# Patient Record
Sex: FEMALE | Race: OTHER
[De-identification: names, ages, dates, MRNs, and addresses within clinical notes are randomized per-mention and may not be internally consistent; named-entity substitution may affect disease eponyms.]

---

## 2021-07-12 ENCOUNTER — HOSPITAL ENCOUNTER (OUTPATIENT)
Dept: HOSPITAL 63 - RAD | Age: 51
End: 2021-07-12
Attending: PHYSICIAN ASSISTANT
Payer: COMMERCIAL
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
## 2021-07-12 DIAGNOSIS — S89.92XA: Primary | ICD-10-CM
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
## 2021-07-12 PROCEDURE — 73590 X-RAY EXAM OF LOWER LEG: CPT

## 2021-07-12 NOTE — RAD
EXAM: Left tibia and fibula, 2 views.



HISTORY: Shin injury.



COMPARISON: None.



FINDINGS: 2 views of the tibiofibular obtained. There is no fracture, dislocation or subluxation. The
re is no periosteal reaction or lytic or sclerotic osseous lesion. There is no radiodense foreign bod
y.



IMPRESSION: No acute osseous finding.



Electronically signed by: Alysa Merlos MD (7/12/2021 11:41 AM) [id number]

## 2021-10-08 ENCOUNTER — HOSPITAL ENCOUNTER (OUTPATIENT)
Dept: HOSPITAL 63 - LAB | Age: 51
End: 2021-10-08
Attending: INTERNAL MEDICINE
Payer: COMMERCIAL

## 2021-10-08 DIAGNOSIS — Z01.812: Primary | ICD-10-CM

## 2021-10-08 DIAGNOSIS — Z12.11: ICD-10-CM

## 2021-10-08 DIAGNOSIS — Z20.822: ICD-10-CM

## 2021-10-08 PROCEDURE — U0003 INFECTIOUS AGENT DETECTION BY NUCLEIC ACID (DNA OR RNA); SEVERE ACUTE RESPIRATORY SYNDROME CORONAVIRUS 2 (SARS-COV-2) (CORONAVIRUS DISEASE [COVID-19]), AMPLIFIED PROBE TECHNIQUE, MAKING USE OF HIGH THROUGHPUT TECHNOLOGIES AS DESCRIBED BY CMS-2020-01-R: HCPCS

## 2021-10-12 ENCOUNTER — HOSPITAL ENCOUNTER (OUTPATIENT)
Dept: HOSPITAL 63 - SURG | Age: 51
Discharge: HOME | End: 2021-10-12
Attending: INTERNAL MEDICINE
Payer: COMMERCIAL

## 2021-10-12 VITALS — DIASTOLIC BLOOD PRESSURE: 67 MMHG | SYSTOLIC BLOOD PRESSURE: 133 MMHG

## 2021-10-12 DIAGNOSIS — Z72.89: ICD-10-CM

## 2021-10-12 DIAGNOSIS — Z12.11: Primary | ICD-10-CM

## 2021-10-12 DIAGNOSIS — Z98.890: ICD-10-CM

## 2021-10-12 DIAGNOSIS — Z79.899: ICD-10-CM

## 2021-10-12 DIAGNOSIS — K63.89: ICD-10-CM

## 2021-10-12 DIAGNOSIS — G47.30: ICD-10-CM

## 2021-10-12 DIAGNOSIS — Z80.0: ICD-10-CM

## 2021-10-12 LAB — U PREG PATIENT: NEGATIVE

## 2021-10-12 PROCEDURE — 81025 URINE PREGNANCY TEST: CPT

## 2021-10-12 PROCEDURE — 45378 DIAGNOSTIC COLONOSCOPY: CPT

## 2021-10-29 ENCOUNTER — HOSPITAL ENCOUNTER (EMERGENCY)
Dept: HOSPITAL 63 - ER | Age: 51
Discharge: HOME | End: 2021-10-29
Payer: COMMERCIAL

## 2021-10-29 VITALS
SYSTOLIC BLOOD PRESSURE: 129 MMHG | DIASTOLIC BLOOD PRESSURE: 70 MMHG | SYSTOLIC BLOOD PRESSURE: 129 MMHG | DIASTOLIC BLOOD PRESSURE: 70 MMHG | SYSTOLIC BLOOD PRESSURE: 129 MMHG | DIASTOLIC BLOOD PRESSURE: 70 MMHG

## 2021-10-29 VITALS — HEIGHT: 62 IN | BODY MASS INDEX: 27.38 KG/M2 | WEIGHT: 148.81 LBS

## 2021-10-29 DIAGNOSIS — R00.2: Primary | ICD-10-CM

## 2021-10-29 LAB
ALBUMIN SERPL-MCNC: 4.2 G/DL (ref 3.4–5)
ALBUMIN/GLOB SERPL: 0.9 {RATIO} (ref 1–1.7)
ALP SERPL-CCNC: 88 U/L (ref 46–116)
ALT SERPL-CCNC: 25 U/L (ref 14–59)
ANION GAP SERPL CALC-SCNC: 7 MMOL/L (ref 6–14)
AST SERPL-CCNC: 12 U/L (ref 15–37)
BASOPHILS # BLD AUTO: 0.1 X10^3/UL (ref 0–0.2)
BASOPHILS NFR BLD: 1 % (ref 0–3)
BILIRUB SERPL-MCNC: 0.4 MG/DL (ref 0.2–1)
BUN/CREAT SERPL: 23 (ref 6–20)
CA-I SERPL ISE-MCNC: 16 MG/DL (ref 7–20)
CALCIUM SERPL-MCNC: 9.3 MG/DL (ref 8.5–10.1)
CHLORIDE SERPL-SCNC: 102 MMOL/L (ref 98–107)
CO2 SERPL-SCNC: 28 MMOL/L (ref 21–32)
CREAT SERPL-MCNC: 0.7 MG/DL (ref 0.6–1)
EOSINOPHIL NFR BLD: 0.1 X10^3/UL (ref 0–0.7)
EOSINOPHIL NFR BLD: 1 % (ref 0–3)
ERYTHROCYTE [DISTWIDTH] IN BLOOD BY AUTOMATED COUNT: 13.7 % (ref 11.5–14.5)
GFR SERPLBLD BASED ON 1.73 SQ M-ARVRAT: 88.2 ML/MIN
GLOBULIN SER-MCNC: 4.5 G/DL (ref 2.2–3.8)
GLUCOSE SERPL-MCNC: 88 MG/DL (ref 70–99)
HCT VFR BLD CALC: 45.8 % (ref 36–47)
HGB BLD-MCNC: 15.3 G/DL (ref 12–15.5)
LYMPHOCYTES # BLD: 2.3 X10^3/UL (ref 1–4.8)
LYMPHOCYTES NFR BLD AUTO: 32 % (ref 24–48)
Lab: 5 NG/L (ref 4–50)
MCH RBC QN AUTO: 30 PG (ref 25–35)
MCHC RBC AUTO-ENTMCNC: 33 G/DL (ref 31–37)
MCV RBC AUTO: 90 FL (ref 79–100)
MONO #: 0.4 X10^3/UL (ref 0–1.1)
MONOCYTES NFR BLD: 6 % (ref 0–9)
NEUT #: 4.2 X10^3UL (ref 1.8–7.7)
NEUTROPHILS NFR BLD AUTO: 60 % (ref 31–73)
PLATELET # BLD AUTO: 287 X10^3/UL (ref 140–400)
POTASSIUM SERPL-SCNC: 3.8 MMOL/L (ref 3.5–5.1)
PROT SERPL-MCNC: 8.7 G/DL (ref 6.4–8.2)
RBC # BLD AUTO: 5.09 X10^6/UL (ref 3.5–5.4)
SODIUM SERPL-SCNC: 137 MMOL/L (ref 136–145)
WBC # BLD AUTO: 7 X10^3/UL (ref 4–11)

## 2021-10-29 PROCEDURE — 99285 EMERGENCY DEPT VISIT HI MDM: CPT

## 2021-10-29 PROCEDURE — 93005 ELECTROCARDIOGRAM TRACING: CPT

## 2021-10-29 PROCEDURE — 80053 COMPREHEN METABOLIC PANEL: CPT

## 2021-10-29 PROCEDURE — 36415 COLL VENOUS BLD VENIPUNCTURE: CPT

## 2021-10-29 PROCEDURE — 96360 HYDRATION IV INFUSION INIT: CPT

## 2021-10-29 PROCEDURE — 85025 COMPLETE CBC W/AUTO DIFF WBC: CPT

## 2021-10-29 PROCEDURE — 71045 X-RAY EXAM CHEST 1 VIEW: CPT

## 2021-10-29 PROCEDURE — 84484 ASSAY OF TROPONIN QUANT: CPT

## 2021-10-29 NOTE — RAD
Exam Date:  10/29/2021 10:51 AM



XR CHEST 1V



Indication: Reason: PALPITATIONS / Spl. Instructions:  / History: .







FINDINGS/

IMPRESSION:





The cardiac silhouette and pulmonary vasculature are within normal limits.  

There is no focal consolidation, pleural effusion or pneumothorax.  

The visualized osseous structures are intact.





Electronically signed by: Vamsi Dickey MD (10/29/2021 11:03 AM) [id number]

## 2021-10-29 NOTE — PHYS DOC
Past History


Additional Past Medical Histor:  sleep apnia.


 (ЮЛИЯ MANRIQUEZ)


Past Surgical History:  


 (ЮЛИЯ MANRIQUEZ)


Alcohol Use:  None


 (ЮЛИЯ MANRIQUEZ)





General Adult


EDM:


Chief Complaint:  Palpitations





HPI:


HPI:


Patient is a 51-year-old female who presents to the emergency department for in

termittent palpitations x3 weeks.  Patient reports that she had these 

palpitations 5 years ago and had a negative work-up.  She describes it as a 

butterfly feeling in her chest.  It is not associated with any activity as she 

states that it comes on at rest and with activity.  She states that it usually 

lasts about 10 to 30 minutes.  Only medical history of sleep apnea.  Her primary

care provider is Dr. Chung.  She denies chest pain, shortness of breath, nausea, 

vomiting, dizziness or lightheadedness, decreased appetite, urinary symptoms.


 (ЮЛИЯ MANRIQUEZ)





Review of Systems:


Review of Systems:


14 body systems of the review of systems have been reviewed.  See HPI for 

pertinent positive and negative responses, otherwise all other systems are 

negative, nonpertinent or noncontributory


 (ЮЛИЯ MANRIQUEZ)





Allergies:


Allergies:





Allergies








Coded Allergies Type Severity Reaction Last Updated Verified


 


  No Known Drug Allergies    10/12/21 No








 (ЮЛИЯ MANRIQUEZ)





Physical Exam:


PE:





Constitutional: Well developed, well nourished, no acute distress, non-toxic 

appearance. []


HENT: Normocephalic, atraumatic, bilateral external ears normal, oropharynx 

moist, no oral exudates, nose normal. []


Eyes: PERRL, EOMI, conjunctiva normal, no discharge. [] 


Neck: Normal range of motion, no stridor


Cardiovascular:Heart rate regular rhythm, no murmur []


Lungs & Thorax:  Bilateral breath sounds clear to auscultation []


Abdomen: Bowel sounds normal, soft, no tenderness, no masses, no pulsatile 

masses. [] 


Skin: Warm, dry, no erythema, no rash. [] 


Back: Normal range of motion


Extremities: No tenderness, no cyanosis, no clubbing, ROM intact, no edema. [] 


Neurologic: Alert and oriented X 3, normal motor function, normal sensory 

function, no focal deficits noted. []


Psychologic: Affect normal, judgement normal, mood normal. []


 (ЮЛИЯ MANRIQUEZ)





Current Patient Data:


Labs:





Laboratory Tests








Test


 10/29/21


10:50


 


White Blood Count 7.0 x10^3/uL 


 


Red Blood Count 5.09 x10^6/uL 


 


Hemoglobin 15.3 g/dL 


 


Hematocrit 45.8 % 


 


Mean Corpuscular Volume 90 fL 


 


Mean Corpuscular Hemoglobin 30 pg 


 


Mean Corpuscular Hemoglobin


Concent 33 g/dL 





 


Red Cell Distribution Width 13.7 % 


 


Platelet Count 287 x10^3/uL 


 


Neutrophils (%) (Auto) 60 % 


 


Lymphocytes (%) (Auto) 32 % 


 


Monocytes (%) (Auto) 6 % 


 


Eosinophils (%) (Auto) 1 % 


 


Basophils (%) (Auto) 1 % 


 


Neutrophils # (Auto) 4.2 x10^3uL 


 


Lymphocytes # (Auto) 2.3 x10^3/uL 


 


Monocytes # (Auto) 0.4 x10^3/uL 


 


Eosinophils # (Auto) 0.1 x10^3/uL 


 


Basophils # (Auto) 0.1 x10^3/uL 


 


Sodium Level 137 mmol/L 


 


Potassium Level 3.8 mmol/L 


 


Chloride Level 102 mmol/L 


 


Carbon Dioxide Level 28 mmol/L 


 


Anion Gap 7 


 


Blood Urea Nitrogen 16 mg/dL 


 


Creatinine 0.7 mg/dL 


 


Estimated GFR


(Cockcroft-Gault) 88.2 





 


BUN/Creatinine Ratio 23 


 


Glucose Level 88 mg/dL 


 


Calcium Level 9.3 mg/dL 


 


Total Bilirubin 0.4 mg/dL 


 


Aspartate Amino Transf


(AST/SGOT) 12 U/L 





 


Alanine Aminotransferase


(ALT/SGPT) 25 U/L 





 


Alkaline Phosphatase 88 U/L 


 


Troponin I High Sensitivity 5 ng/L 


 


Total Protein 8.7 g/dL 


 


Albumin 4.2 g/dL 


 


Albumin/Globulin Ratio 0.9 








Current Medications








 Medications


  (Trade)  Dose


 Ordered  Sig/Savana


 Route


 PRN Reason  Start Time


 Stop Time Status Last Admin


Dose Admin


 


 Sodium Chloride  1,000 ml @ 


 1,000 mls/hr  Q1H


 IV


   10/29/21 11:00


 10/29/21 11:59  10/29/21 11:00











Vital Signs:





                                   Vital Signs








  Date Time  Temp Pulse Resp B/P (MAP) Pulse Ox O2 Delivery O2 Flow Rate FiO2


 


10/29/21 10:34 98.5 72 16 161/62 (95) 98 Room Air  








 (ЮЛИЯ MANRIQUEZ)





EKG:


EKG:


EKG performed by ER staff at 1040 shows sinus rhythm, rate of 61, QTc 410, no 

STEMI read by Dr. Forrester at 1046.


 (ЮЛИЯ MANRIQUEZ)





Radiology/Procedures:


Radiology/Procedures:


[]PROCEDURE: PORTABLE CHEST 1V





Exam Date:  10/29/2021 10:51 AM





XR CHEST 1V





Indication: Reason: PALPITATIONS / Spl. Instructions:  / History: .











FINDINGS/


IMPRESSION:








The cardiac silhouette and pulmonary vasculature are within normal limits.  


There is no focal consolidation, pleural effusion or pneumothorax.  


The visualized osseous structures are intact.








Electronically signed by: Cammy Dickey MD (10/29/2021 11:03 AM) [id number]














DICTATED AND SIGNED BY:     CAMMY DICKEY MD


DATE:     10/29/21 1103





CC: ЮЛИЯ MANRIQUEZ; TARA STEIN ~MTH0 0


 (ЮЛИЯ MANRIQUEZ)





Heart Score:


C/O Chest Pain:  No


Risk Factors:


Risk Factors:  DM, Current or recent (<one month) smoker, HTN, HLP, family 

history of CAD, obesity.


Risk Scores:


Score 0 - 3:  2.5% MACE over next 6 weeks - Discharge Home


Score 4 - 6:  20.3% MACE over next 6 weeks - Admit for Clinical Observation


Score 7 - 10:  72.7% MACE over next 6 weeks - Early Invasive Strategies


 (ЮЛИЯ MANRIQUEZ)





Course & Med Decision Making:


Course & Med Decision Making


Pertinent Labs and Imaging studies reviewed. (See chart for details)





Patient presents to the emergency department for intermittent palpitations x3 

weeks.  Work-up in the ER consisted of blood work, EKG and chest x-ray.  Patient

 has an appointment to follow-up with Dr. Rosado on Tuesday.  Patient's work-up

 in the emergency department was unremarkable.  She had a negative troponin.  

Her chest x-ray was also unremarkable.  Patient advised to follow-up with Dr. Rosado regarding her visit.  While in the ER patient was not noted to have any 

EKG abnormalities.  She had a normal EKG while in the ER.  Patient's vital signs

 continue to be stable.  I discussed with patient all findings and diagnostic 

testing as well as the need to follow-up with PCP for further evaluation and 

treatment or return to the ER if any new or worsening symptoms.  Strict return 

precautions were also discussed at length.  Patient voiced understanding and 

agreement with the plan.  Patient is hemodynamically stable at the time of 

disposition.


 (ЮЛИЯ MANRIQUEZ)





Gustavo Disclaimer:


Dragon Disclaimer:


This electronic medical record was generated, in whole or in part, using a voice

 recognition dictation system.


 (ЮЛИЯ MANRIQUEZ)


Attending Co-Sign


The patient was seen and interviewed as well as examined at the bedside. The 

chart was reviewed. The case was discussed. Agree with the plan of care.


 (BUDDY FORRESTER DO)





Departure


Departure:


Impression:  


   Primary Impression:  


   Palpitations


Disposition:   HOME / SELF CARE / HOMELESS


Condition:  GOOD


Referrals:  


TARA STEIN (PCP)








SAV MARTIN MD


Patient Instructions:  Palpitations





Additional Instructions:  


You were seen in the emergency department today for palpitations.  Your work-up 

in the ER was unremarkable and your physical exam is reassuring.  At this time, 

does not appear that you are experiencing acute coronary syndrome.  Your EKG did

 not show any abnormal beats.  I would advise you to follow-up with your primary

 care provider as soon as possible.  They may want you to wear a cardiac monitor

 over several days to see if you have any rhythm abnormalities.  Follow-up with 

Dr. Rosado as previously scheduled on Tuesday.  You may need to follow-up with 

cardiology.  You are being sent home with a referral for a cardiologist.  Please

 contact them to set up an appointment.  Return to the emergency department if 

you develop chest pain, worsening of your palpitations, shortness of breath, 

high fevers refractory to treatment, intractable nausea or vomiting, dizziness 

or lightheadedness.





EMERGENCY DEPARTMENT GENERAL DISCHARGE INSTRUCTIONS





Thank you for coming to [facility] (ED) today and trusting us

 with you 


care.  We trust that you had a positivie experience in our Emergency Department.

  If you 


wish to speak to the department management, you may call the director at 

[phone].





YOUR FOLLOW UP INSTRUCTIONS ARE AS FOLLOWS:





1.  Do you have a private Doctor?  If you do not have a private doctor, please 

ask for a 


resource list of physicians or clinics that may be able to assist you with 

follow up care.





2.  The Emergency Physician has interpreted your x-rays.  The X-Ray specialist 

will also 


review them.  If there is a change in the findings, you will be notified in 48 

hours when at 


all possible.





3.  A lab test or culture has been done, your results will be reviewed and you 

will be 


notified if you need a change in treatment.





ADDITIONAL INSTRUCTIONS AND INFORMATION:





1.  Your care today has been supervised by a physician who is specially trained 

in emergency 


care.  Many problems require more than one evaluation for a complete diagnosis 

and 


treatment.  We recommend that you schedule your follow up appointment as oriana

mmended to 


ensure complete treatment of you illness or injury.  If you are unable to obtain

 follow up 


care and continue to have a problem, or if your condition worsens, we recommend 

that you 


return to the ED.





2.  We are not able to safely determine your condition over the phone nor are we

 able to 


give sound medical advice over the phone.  For these safety reasons, if you call

 for medical 


advice we will ask you to come to the ED for further evaluation.





3.  If you have any questions regarding these discharge instructions please call

 the ED at 


[phone].





SAFETY INFORMATION:





In the interest of safety, wellness, and injury prevention; we encourage you to 

wear your 


sealbelt, if you smoke; quite smoking, and we encourage family to use a 

protective helmet 


for bicycling and other sporting events that present an increased risk for head 

injury.





IF YOUR SYMPTOMS WORSEN OR NEW SYMPTOMS DEVELOP, OR YOU HAVE CONCERNS ABOUT YOUR

 CONDITION; 


OR IF YOUR CONDITION WORSENS WHILE YOU ARE WAITING FOR YOUR FOLLOW UP 

APPOINTMENT; EITHER 


CONTACT YOUR PRIMARY CARE DOCTOR, THE PHYSICIAN WHOSE NAME AND NUMBER YOU WERE 

GIVEN, OR 


RETURN TO THE ED IMMEDIATELY.











ЮЛИЯ MANRIQUEZ          Oct 29, 2021 10:53


BUDDY FORRESTER DO                 Oct 29, 2021 17:34

## 2021-10-30 NOTE — EKG
[facility] [address], [place] [address]

Test Date:    2021-10-29               Test Time:    10:40:39

Pat Name:     MAUREEN GONZALEZ           Department:   

Patient ID:   [id number]           Room:          

Gender:       F                        Technician:   

:          [id number]               Requested By: ЮЛИЯ MANRIQUEZ

Order Number: [id number]            Reading MD:   Tyler Houston

                                 Measurements

Intervals                              Axis          

Rate:         61                       P:            19

AR:           176                      QRS:          94

QRSD:         76                       T:            26

QT:           406                                    

QTc:          410                                    

                           Interpretive Statements

SINUS RHYTHM

Electronically Signed On 10- 13:33:03 CDT by Tyler Houston

## 2022-01-03 ENCOUNTER — HOSPITAL ENCOUNTER (OUTPATIENT)
Dept: HOSPITAL 63 - MAMMO | Age: 52
End: 2022-01-03
Attending: FAMILY MEDICINE
Payer: COMMERCIAL

## 2022-01-03 DIAGNOSIS — Z12.31: Primary | ICD-10-CM

## 2022-01-03 PROCEDURE — 77067 SCR MAMMO BI INCL CAD: CPT

## 2022-01-03 PROCEDURE — 77063 BREAST TOMOSYNTHESIS BI: CPT

## 2022-01-26 ENCOUNTER — HOSPITAL ENCOUNTER (OUTPATIENT)
Dept: HOSPITAL 63 - US | Age: 52
End: 2022-01-26
Attending: PHYSICIAN ASSISTANT
Payer: COMMERCIAL

## 2022-01-26 DIAGNOSIS — N60.01: ICD-10-CM

## 2022-01-26 DIAGNOSIS — R92.2: ICD-10-CM

## 2022-01-26 DIAGNOSIS — N60.02: Primary | ICD-10-CM

## 2022-01-26 NOTE — RAD
EXAM: Bilateral breast sonogram.



HISTORY: 51-year-old female presents for evaluation of nodularity within both breasts demonstrated on
 a mammogram dated 1/3/2022.



TECHNIQUE: Sonographic imaging of both breast targeted to sites of mammographic nodularity was perfor
med.



COMPARISON: 1/3/2022.



FINDINGS: Sonographic imaging of the right breast demonstrates 3 simple cysts. These measure 6 mm at 
the 9:00 position 3 cm from the nipple, 9 mm at the 10:00 position 4 cm from the nipple, and abdomen 
at the 12:00 position. These correspond with the nodules of concern on the recent screening mammogram
. There is no suspicious finding within the right breast or right axilla.



Sonographic imaging of the left breast demonstrates a 6 mm cyst with internal septation or cluster of
 cysts at the 11:00 position 3 cm from the nipple. This corresponds with the nodule concern on the re
cent screening mammogram. There is no suspicious finding within the left breast or left axilla.



IMPRESSION:

1. Small benign cysts within the right greater than left breast, corresponding with recently demonstr
ated areas of mammographic nodularity.

2. No persistent suspicious mammographic or sonographic finding.

3. BI-RADS Category 2: Benign finding(s). Annual mammography is recommended.



Electronically signed by: Alysa Merlos MD (1/26/2022 2:48 PM) [id number]